# Patient Record
Sex: FEMALE | Race: WHITE | NOT HISPANIC OR LATINO
[De-identification: names, ages, dates, MRNs, and addresses within clinical notes are randomized per-mention and may not be internally consistent; named-entity substitution may affect disease eponyms.]

---

## 2024-03-29 ENCOUNTER — APPOINTMENT (OUTPATIENT)
Dept: BREAST CENTER | Facility: CLINIC | Age: 59
End: 2024-03-29

## 2024-04-25 PROBLEM — Z00.00 ENCOUNTER FOR PREVENTIVE HEALTH EXAMINATION: Status: ACTIVE | Noted: 2024-04-25

## 2024-04-30 ENCOUNTER — APPOINTMENT (OUTPATIENT)
Dept: BREAST CENTER | Facility: CLINIC | Age: 59
End: 2024-04-30
Payer: COMMERCIAL

## 2024-04-30 VITALS
HEART RATE: 89 BPM | HEIGHT: 61 IN | DIASTOLIC BLOOD PRESSURE: 83 MMHG | SYSTOLIC BLOOD PRESSURE: 125 MMHG | OXYGEN SATURATION: 96 % | BODY MASS INDEX: 23.6 KG/M2 | WEIGHT: 125 LBS

## 2024-04-30 DIAGNOSIS — Z90.12 ACQUIRED ABSENCE OF LEFT BREAST AND NIPPLE: ICD-10-CM

## 2024-04-30 DIAGNOSIS — Z87.891 PERSONAL HISTORY OF NICOTINE DEPENDENCE: ICD-10-CM

## 2024-04-30 DIAGNOSIS — Z86.39 PERSONAL HISTORY OF OTHER ENDOCRINE, NUTRITIONAL AND METABOLIC DISEASE: ICD-10-CM

## 2024-04-30 DIAGNOSIS — Z98.890 OTHER SPECIFIED POSTPROCEDURAL STATES: ICD-10-CM

## 2024-04-30 DIAGNOSIS — Z78.9 OTHER SPECIFIED HEALTH STATUS: ICD-10-CM

## 2024-04-30 DIAGNOSIS — Z80.51 FAMILY HISTORY OF MALIGNANT NEOPLASM OF KIDNEY: ICD-10-CM

## 2024-04-30 DIAGNOSIS — R92.0 MAMMOGRAPHIC MICROCALCIFICATION FOUND ON DIAGNOSTIC IMAGING OF BREAST: ICD-10-CM

## 2024-04-30 PROCEDURE — 99205 OFFICE O/P NEW HI 60 MIN: CPT

## 2024-04-30 RX ORDER — ROSUVASTATIN CALCIUM 5 MG/1
TABLET, FILM COATED ORAL
Refills: 0 | Status: ACTIVE | COMMUNITY

## 2024-04-30 RX ORDER — LEVOTHYROXINE SODIUM 137 UG/1
TABLET ORAL
Refills: 0 | Status: ACTIVE | COMMUNITY

## 2024-04-30 NOTE — PHYSICAL EXAM
[Normocephalic] : normocephalic [Atraumatic] : atraumatic [EOMI] : extra ocular movement intact [Supple] : supple [No Supraclavicular Adenopathy] : no supraclavicular adenopathy [No Cervical Adenopathy] : no cervical adenopathy [Examined in the supine and seated position] : examined in the supine and seated position [No dominant masses] : no dominant masses in right breast  [No dominant masses] : no dominant masses left breast [No Nipple Retraction] : no left nipple retraction [No Nipple Discharge] : no left nipple discharge [Breast Mass Right Breast ___cm] : no masses [Breast Mass Left Breast ___cm] : no masses [Breast Nipple Inversion] : nipples not inverted [Breast Nipple Retraction] : nipples not retracted [Breast Nipple Flattening] : nipples not flattened [Breast Nipple Fissures] : nipples not fissured [Breast Abnormal Lactation (Galactorrhea)] : no galactorrhea [Breast Abnormal Secretion Bloody Fluid] : no bloody discharge [Breast Abnormal Secretion Serous Fluid] : no serous discharge [Breast Abnormal Secretion Opalescent Fluid] : no milky discharge [No Axillary Lymphadenopathy] : no left axillary lymphadenopathy [No Edema] : no edema [No Rashes] : no rashes [No Ulceration] : no ulceration [de-identified] : On exam, the patient has small B-cup breasts.  On palpation, she has a healing left breast 6:00 periareolar incision from her recent partial mastectomy with some scarring changes but no suspicious masses.  She has no suspicious masses in the right breast.  She has no axillary, supraclavicular, or cervical adenopathy. [de-identified] : Healing left breast 6:00 periareolar incision with no suspicious findings.

## 2024-04-30 NOTE — HISTORY OF PRESENT ILLNESS
[FreeTextEntry1] : The patient is a 59-year-old G2, P2 postmenopausal white female of [place] and [place] descent.  She underwent menarche at age 11 and had her first child at age 22.  She underwent menopause at age 46 and never took any hormone replacement therapy.  She has no family history of breast or ovarian cancer.  Her sister had renal cell carcinoma and underwent a nephrectomy.  She quit smoking at age 56 and smoked about a half a pack a day starting in her 20s.  Her  is also a smoker.  The patient underwent a screening mammography at [facility] in [place] back on January 2, 2024 showing some increasing calcifications in the left breast 6:00 region.  Diagnostic mammography confirmed these increasing calcifications on January 11, 2024.  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  She underwent a bilateral breast MRI on March 27, 2024 which showed an area of non-mass like enhancement measuring 3.2 x 2.1 x 2.5 cm corresponding to the area of calcifications on mammography.  She was then seen by Dr. Demetrice White at [facility] and underwent a left breast partial mastectomy and sentinel lymph node biopsy through a 6:00 periareolar incision on April 9, 2024.  The final pathology showed 1 negative sentinel lymph node and high-grade DCIS in 10 of 20 slides measuring 1.2 cm in greatest dimension.  She had a positive superior and medial margin and closest inferior and anterior margins less than a millimeter.  The DCIS was ER positive at 90% and ID moderately positive at 60%.  She comes in now as a surgical second opinion/consultation.

## 2024-04-30 NOTE — ASSESSMENT
[FreeTextEntry1] : The patient is a 59-year-old G2, P2 postmenopausal white female of [place] and [place] descent.  She underwent menarche at age 11 and had her first child at age 22.  She underwent menopause at age 46 and never took any hormone replacement therapy.  She has no family history of breast or ovarian cancer.  Her sister had renal cell carcinoma and underwent a nephrectomy.  She quit smoking at age 56 and smoked about a half a pack a day starting in her 20s.  Her  is also a smoker.  The patient underwent a screening mammography at [facility] in [place] back on January 2, 2024 showing some increasing calcifications in the left breast 6:00 region.  Diagnostic mammography confirmed these increasing calcifications on January 11, 2024.  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  She underwent a bilateral breast MRI on March 27, 2024 which showed an area of non-mass like enhancement measuring 3.2 x 2.1 x 2.5 cm corresponding to the area of calcifications on mammography.  She was then seen by Dr. Demetrice White at [facility] and underwent a left breast partial mastectomy and sentinel lymph node biopsy through a 6:00 periareolar incision on April 9, 2024.  The final pathology showed 1 negative sentinel lymph node and high-grade DCIS in 10 of 20 slides measuring 1.2 cm in greatest dimension.  She had a positive superior and medial margin and closest inferior and anterior margins less than a millimeter.  The DCIS was ER positive at 90% and SC moderately positive at 60%.  I reviewed her imaging from [facility] and indeed she had a fairly extensive area of calcifications in the 6:00 region of the left breast extending posteriorly.  I did review the pathology from her partial mastectomy as well on April 9, 2024 which showed this extensive DCIS with a positive superior and medial margin and close inferior and anterior margin.  She did have bracketed wire localizations.  I did speak to her with her  and sister present during the discussion regarding her surgical options.  I would suggest a mastectomy given the extent the calcifications I am seeing on mammography and her breast size.  She could reattempt a partial mastectomy but she would then risk the possible need for further excision or a possible high risk of local recurrence given the extent of this DCIS.  I think she would be a candidate for nipple sparing technique and I did review her images and it looks like the cancer was far away from the nipple but I will have our radiologist review the films.  I did tell her that I do retroareolar biopsies at the time of nipple sparing mastectomies and if these did show cancer, I would remove the nipples.  She would also need a slide review if I did perform surgery.  She was offered immediate reconstruction if she does go through with the mastectomy and I gave her Dr. oRdgers and Dr. Lerman's card to make an appointment.  She would not require any further virgie evaluation.  The patient is leaning towards implant reconstruction.  She was asking about prophylactic contralateral mastectomy but I am dissuading her from this given the lack of any survival benefit.  I did also speak to her about genetic testing and she would like to think about this and will get back to us if she wants to proceed.  Risk benefits of surgery including infection, hematoma, and skin flap necrosis or nipple loss were described to the patient and she has a full understanding.  All their questions were answered and she will make appointments with her plastic surgeons and then we can go from there for possible scheduling.

## 2024-04-30 NOTE — REASON FOR VISIT
[Initial Evaluation] : an initial evaluation [FreeTextEntry1] : The patient is a postmenopausal white female of [place] and [place]ian descent with no family history of breast or ovarian cancer.  She was found to have some increasing calcifications in her left breast 6:00 region on screening mammography in January 2024 and underwent a stereotactic core biopsy at [facility] in February 2024 showing high nuclear grade DCIS which was ER/AL positive.  MRI showed localized but significant non-mass like enhancement around the calcifications.  She was seen by Dr. Demetrice White at [facility] and underwent a left breast partial mastectomy and sentinel lymph node biopsy on April 9, 2024 and had 1 negative sentinel lymph node and the partial mastectomy showed fairly extensive DCIS and 10 out of 20 slides with a positive superior and medial margin and close inferior and anterior margins less than 1 mm.  She comes in now for a surgical second opinion/discussion

## 2024-04-30 NOTE — PAST MEDICAL HISTORY
[Postmenopausal] : The patient is postmenopausal [Menarche Age ____] : age at menarche was [unfilled] [Menopause Age____] : age at menopause was [unfilled] [unknown] : the patient is unsure of the date of her LMP [Total Preg ___] : G[unfilled] [Live Births ___] : P[unfilled]  [Age At Live Birth ___] : Age at live birth: [unfilled] [History of Hormone Replacement Treatment] : has no history of hormone replacement treatment

## 2024-04-30 NOTE — ADDENDUM
[FreeTextEntry1] : I spent greater than 75% of consultation in face-to-face counseling and coordination of care in this patient with newly diagnosed fairly extensive left breast DCIS status post a prior partial mastectomy with positive margins.

## 2024-05-01 ENCOUNTER — TRANSCRIPTION ENCOUNTER (OUTPATIENT)
Age: 59
End: 2024-05-01

## 2024-05-02 ENCOUNTER — NON-APPOINTMENT (OUTPATIENT)
Age: 59
End: 2024-05-02

## 2024-05-14 ENCOUNTER — NON-APPOINTMENT (OUTPATIENT)
Age: 59
End: 2024-05-14

## 2024-05-14 ENCOUNTER — APPOINTMENT (OUTPATIENT)
Dept: PLASTIC SURGERY | Facility: CLINIC | Age: 59
End: 2024-05-14
Payer: COMMERCIAL

## 2024-05-14 VITALS
HEART RATE: 78 BPM | BODY MASS INDEX: 23.62 KG/M2 | RESPIRATION RATE: 16 BRPM | OXYGEN SATURATION: 98 % | WEIGHT: 125 LBS

## 2024-05-14 PROCEDURE — 99204 OFFICE O/P NEW MOD 45 MIN: CPT

## 2024-05-14 NOTE — HISTORY OF PRESENT ILLNESS
[FreeTextEntry1] : 58y/o female presents for initial consultation referred by Dr. Jarrett for b/l breast reconstruction after B/L mastectomy. Mammogram was done in January 2024 which showed increasing calcifications in her left breast. A stereotactic core biopsy was done in February 2024 showing high nuclear grade DCIS which was ER/UT positive. No family hx of breast or ovarian cancer. No hx of bleeding/clotting disorders. Has never had radiation treatments. Has never had abdominal surgery.   B/L breasts soft, L breast healed periareolar incision with scarring BD B/L 11-12 cm  Plan: Today we discussed all options for breast reconstruction including no reconstruction and reconstruction using tissue expanders and implants, as well as autologous reconstruction. The nature of each surgery, its risks, benefits, alternatives, expected postoperative course, recovery and long-term results were discussed in detail. All questions were answered. FORTINO is motivated to proceed with implant reconstruction. Will coordinate care with Dr. Jarrett.

## 2024-05-15 ENCOUNTER — NON-APPOINTMENT (OUTPATIENT)
Age: 59
End: 2024-05-15

## 2024-05-22 ENCOUNTER — NON-APPOINTMENT (OUTPATIENT)
Age: 59
End: 2024-05-22

## 2024-06-03 ENCOUNTER — NON-APPOINTMENT (OUTPATIENT)
Age: 59
End: 2024-06-03

## 2024-06-03 ENCOUNTER — RESULT REVIEW (OUTPATIENT)
Age: 59
End: 2024-06-03

## 2024-06-07 ENCOUNTER — APPOINTMENT (OUTPATIENT)
Dept: PLASTIC SURGERY | Facility: CLINIC | Age: 59
End: 2024-06-07

## 2024-06-07 RX ORDER — OXYCODONE AND ACETAMINOPHEN 5; 325 MG/1; MG/1
5-325 TABLET ORAL EVERY 6 HOURS
Qty: 20 | Refills: 0 | Status: ACTIVE | COMMUNITY
Start: 2024-06-07 | End: 1900-01-01

## 2024-06-12 ENCOUNTER — TRANSCRIPTION ENCOUNTER (OUTPATIENT)
Age: 59
End: 2024-06-12

## 2024-06-12 ENCOUNTER — APPOINTMENT (OUTPATIENT)
Dept: BREAST CENTER | Facility: HOSPITAL | Age: 59
End: 2024-06-12

## 2024-06-12 ENCOUNTER — RESULT REVIEW (OUTPATIENT)
Age: 59
End: 2024-06-12

## 2024-06-13 ENCOUNTER — TRANSCRIPTION ENCOUNTER (OUTPATIENT)
Age: 59
End: 2024-06-13

## 2024-06-17 NOTE — REASON FOR VISIT
[Post Op: _________] : a [unfilled] post op visit [FreeTextEntry1] : The patient is a postmenopausal white female of [place] and [place]og[place] descent with no family history of breast or ovarian cancer.  She was found to have some increasing calcifications in her left breast 6:00 region on screening mammography in January 2024 and underwent a stereotactic core biopsy at [facility] in February 2024 showing high nuclear grade DCIS which was ER/NV positive.  MRI showed localized but significant non-mass like enhancement around the calcifications.  She was seen by Dr. Demetrice White at [facility] and underwent a left breast partial mastectomy and sentinel lymph node biopsy on April 9, 2024 and had 1 negative sentinel lymph node and the partial mastectomy showed fairly extensive DCIS in 10 out of 20 slides with a positive superior and medial margin and close inferior and anterior margins less than 1 mm.  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  Images were reviewed and it was felt that mastectomy was indicated.  The patient chose to undergo bilateral mastectomies and this was performed with a nipple sparing technique with prepectoral expander reconstructions by Dr. Rodgers on June 12, 2024.  She comes in now for postop follow-up.

## 2024-06-17 NOTE — HISTORY OF PRESENT ILLNESS
[FreeTextEntry1] : The patient is a 59-year-old G2, P2 postmenopausal white female of Sandi and Persian descent.  She underwent menarche at age 11 and had her first child at age 22.  She underwent menopause at age 46 and never took any hormone replacement therapy.  She has no family history of breast or ovarian cancer.  Her sister had renal cell carcinoma and underwent a nephrectomy.  She quit smoking at age 56 and smoked about a half a pack a day starting in her 20s.  Her  is also a smoker.  The patient underwent a screening mammography at [facility] in [place] back on January 2, 2024 showing some increasing calcifications in the left breast 6:00 region.  Diagnostic mammography confirmed these increasing calcifications on January 11, 2024.  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  She underwent a bilateral breast MRI on March 27, 2024 which showed an area of non-mass like enhancement measuring 3.2 x 2.1 x 2.5 cm corresponding to the area of calcifications on mammography.  She was then seen by Dr. Demetrice White at [facility] and underwent a left breast partial mastectomy and sentinel lymph node biopsy through a 6:00 periareolar incision on April 9, 2024.  The final pathology showed 1 negative sentinel lymph node and high-grade DCIS in 10 of 20 slides measuring 1.2 cm in greatest dimension.  She had a positive superior and medial margin and closest inferior and anterior margins less than a millimeter.  The DCIS was ER positive at 90% and DC moderately positive at 60%.  She was seen as a second opinion on April 30, 2024 and it was felt that a left mastectomy was indicated.  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  Her images were reviewed by my radiologist and it was felt that the cancer was far in enough away from the nipple to offer her nipple sparing technique.  The patient chose to have a contralateral prophylactic mastectomy.  She underwent the bilateral nipple sparing mastectomies through an inframammary technique with prepectoral expander reconstructions by Dr. Rodgers on June 12, 2024.  The final pathology showed ???????. The patient comes in now for postop follow-up.

## 2024-06-17 NOTE — PHYSICAL EXAM
[Normocephalic] : normocephalic [Atraumatic] : atraumatic [EOMI] : extra ocular movement intact [Supple] : supple [No Supraclavicular Adenopathy] : no supraclavicular adenopathy [No Cervical Adenopathy] : no cervical adenopathy [Examined in the supine and seated position] : examined in the supine and seated position [No dominant masses] : no dominant masses in right breast  [No dominant masses] : no dominant masses left breast [No Nipple Retraction] : no left nipple retraction [No Nipple Discharge] : no left nipple discharge [Breast Mass Right Breast ___cm] : no masses [Breast Mass Left Breast ___cm] : no masses [No Axillary Lymphadenopathy] : no left axillary lymphadenopathy [No Edema] : no edema [No Rashes] : no rashes [No Ulceration] : no ulceration [Breast Nipple Inversion] : nipples not inverted [Breast Nipple Retraction] : nipples not retracted [Breast Nipple Flattening] : nipples not flattened [Breast Nipple Fissures] : nipples not fissured [Breast Abnormal Lactation (Galactorrhea)] : no galactorrhea [Breast Abnormal Secretion Bloody Fluid] : no bloody discharge [Breast Abnormal Secretion Serous Fluid] : no serous discharge [Breast Abnormal Secretion Opalescent Fluid] : no milky discharge [de-identified] : On exam, the patient is doing well after her bilateral nipple sparing mastectomies with prepectoral expander reconstructions.  Her wounds are healing well.  Her drains are ??????? [de-identified] : Status post prophylactic contralateral nipple sparing mastectomy through an inframammary approach with prepectoral expander reconstruction.  Wounds are healing well. Her drain is ?????? [de-identified] : Status post completion nipple sparing mastectomy through an inframammary approach with prepectoral expander reconstruction.  Wounds are healing well.  Her drain is ???????

## 2024-06-17 NOTE — ASSESSMENT
[FreeTextEntry1] : The patient is a 59-year-old G2, P2 postmenopausal white female of Sandi and [place] descent.  She underwent menarche at age 11 and had her first child at age 22.  She underwent menopause at age 46 and never took any hormone replacement therapy.  She has no family history of breast or ovarian cancer.  Her sister had renal cell carcinoma and underwent a nephrectomy.  She quit smoking at age 56 and smoked about a half a pack a day starting in her 20s.  Her  is also a smoker.  The patient underwent a screening mammography at [facility] in [place] back on January 2, 2024 showing some increasing calcifications in the left breast 6:00 region.  Diagnostic mammography confirmed these increasing calcifications on January 11, 2024.  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  She underwent a bilateral breast MRI on March 27, 2024 which showed an area of non-mass like enhancement measuring 3.2 x 2.1 x 2.5 cm corresponding to the area of calcifications on mammography.  She was then seen by Dr. Demetrice White at [facility] and underwent a left breast partial mastectomy and sentinel lymph node biopsy through a 6:00 periareolar incision on April 9, 2024.  The final pathology showed 1 negative sentinel lymph node and high-grade DCIS in 10 of 20 slides measuring 1.2 cm in greatest dimension.  She had a positive superior and medial margin and closest inferior and anterior margins less than a millimeter.  The DCIS was ER positive at 90% and TN moderately positive at 60%. She was seen by me as a second opinion on April 30, 2024 and after I reviewed all her images, I felt that a left mastectomy was indicated.  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  Her images were reviewed by my radiologist and it was felt that the cancer was far in enough away from the nipple to offer her nipple sparing technique.  The patient chose to have a contralateral prophylactic mastectomy.  She underwent the bilateral nipple sparing mastectomies through an inframammary technique with prepectoral expander reconstructions by Dr. Rodgers on June 12, 2024.  The final pathology showed ???????. On exam today, ....... I reviewed the pathology with the patient and gave her a copy of the report for her records.  She understands that since this was all DCIS that there has not been shown any benefit for endocrine therapy.  She will not require any radiation oncology evaluation.  She should continue 6-month follow-up and I would like to get an MRI 1 year postop.  The patient should continue to follow-up with Dr. Rodgers from a cosmetic standpoint.

## 2024-06-18 ENCOUNTER — APPOINTMENT (OUTPATIENT)
Dept: PLASTIC SURGERY | Facility: CLINIC | Age: 59
End: 2024-06-18
Payer: COMMERCIAL

## 2024-06-18 DIAGNOSIS — Z98.890 OTHER SPECIFIED POSTPROCEDURAL STATES: ICD-10-CM

## 2024-06-18 PROCEDURE — 99024 POSTOP FOLLOW-UP VISIT: CPT

## 2024-06-18 NOTE — HISTORY OF PRESENT ILLNESS
[FreeTextEntry1] : 60y/o female presents POD# 6 s/p B/L breast recon on 6/12/24. Patient is here today for a routine post-op visit she denies any f/c/n/v. She reports taking Tylenol for pain as needed and has 2 FAUSTO drains in-place.  Dressings taken down, B/L breasts soft, incisions intact, minimal bruising to right breast, implants in good position  No collections or infections  B/L breast drains with moderate serosanguineous drainage, not ready for removal yet   Plan: -continue to maintain B/L breast drains  -f/u in 1 week -activity restrictions reviewed  -sports bra  -case discussed with Dr. Rodgers

## 2024-06-25 ENCOUNTER — APPOINTMENT (OUTPATIENT)
Dept: PLASTIC SURGERY | Facility: CLINIC | Age: 59
End: 2024-06-25
Payer: COMMERCIAL

## 2024-06-25 PROCEDURE — 99024 POSTOP FOLLOW-UP VISIT: CPT

## 2024-06-27 ENCOUNTER — APPOINTMENT (OUTPATIENT)
Dept: BREAST CENTER | Facility: CLINIC | Age: 59
End: 2024-06-27
Payer: COMMERCIAL

## 2024-06-27 VITALS
OXYGEN SATURATION: 96 % | SYSTOLIC BLOOD PRESSURE: 124 MMHG | HEIGHT: 61 IN | DIASTOLIC BLOOD PRESSURE: 79 MMHG | BODY MASS INDEX: 23.41 KG/M2 | WEIGHT: 124 LBS | HEART RATE: 69 BPM

## 2024-06-27 DIAGNOSIS — D05.12 INTRADUCTAL CARCINOMA IN SITU OF LEFT BREAST: ICD-10-CM

## 2024-06-27 DIAGNOSIS — Z90.13 ACQUIRED ABSENCE OF BILATERAL BREASTS AND NIPPLES: ICD-10-CM

## 2024-06-27 DIAGNOSIS — Z85.3 PERSONAL HISTORY OF MALIGNANT NEOPLASM OF BREAST: ICD-10-CM

## 2024-06-27 PROCEDURE — 99024 POSTOP FOLLOW-UP VISIT: CPT

## 2024-06-27 NOTE — PHYSICAL EXAM
[Normocephalic] : normocephalic [Atraumatic] : atraumatic [EOMI] : extra ocular movement intact [Supple] : supple [No Supraclavicular Adenopathy] : no supraclavicular adenopathy [No Cervical Adenopathy] : no cervical adenopathy [Examined in the supine and seated position] : examined in the supine and seated position [No dominant masses] : no dominant masses in right breast  [No dominant masses] : no dominant masses left breast [No Nipple Retraction] : no left nipple retraction [No Nipple Discharge] : no left nipple discharge [Breast Mass Right Breast ___cm] : no masses [Breast Mass Left Breast ___cm] : no masses [No Axillary Lymphadenopathy] : no left axillary lymphadenopathy [No Edema] : no edema [No Rashes] : no rashes [No Ulceration] : no ulceration [Breast Nipple Inversion] : nipples not inverted [Breast Nipple Retraction] : nipples not retracted [Breast Nipple Flattening] : nipples not flattened [Breast Nipple Fissures] : nipples not fissured [Breast Abnormal Lactation (Galactorrhea)] : no galactorrhea [Breast Abnormal Secretion Bloody Fluid] : no bloody discharge [Breast Abnormal Secretion Serous Fluid] : no serous discharge [Breast Abnormal Secretion Opalescent Fluid] : no milky discharge [de-identified] : On exam, the patient is doing well after her bilateral nipple sparing mastectomies with prepectoral direct to implant reconstructions.  Her wounds are healing well.  Her drains have been removed by plastic surgery.  Her skin flaps are viable though she does have some mild epidermolysis of the right nipple areolar complex which should self resolve. [de-identified] : Status post prophylactic contralateral nipple sparing mastectomy through an inframammary approach with prepectoral direct to implant reconstruction.  Wounds are healing well. Her drain has been removed.  She does have some mild epidermolysis of the right nipple areolar complex which should self resolve. [de-identified] : Status post completion nipple sparing mastectomy through an inframammary approach with prepectoral direct to implant reconstruction.  Wounds are healing well.  Her drain has been removed and her skin flap is warm and viable.

## 2024-06-27 NOTE — REASON FOR VISIT
[Post Op: _________] : a [unfilled] post op visit [FreeTextEntry1] : The patient is a postmenopausal white female of [place] and [place] of [place] descent with no family history of breast or ovarian cancer.  She was found to have some increasing calcifications in her left breast 6:00 region on screening mammography in January 2024 and underwent a stereotactic core biopsy at [facility] in February 2024 showing high nuclear grade DCIS which was ER/NC positive.  MRI showed localized but significant non-mass like enhancement around the calcifications.  She was seen by Dr. Demetrice White at [facility] and underwent a left breast partial mastectomy and sentinel lymph node biopsy on April 9, 2024 and had 1 negative sentinel lymph node and the partial mastectomy showed fairly extensive DCIS in 10 out of 20 slides with a positive superior and medial margin and close inferior and anterior margins less than 1 mm.  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  Images were reviewed and it was felt that mastectomy was indicated.  The patient chose to undergo bilateral mastectomies and this was performed with a nipple sparing technique with prepectoral direct to implant reconstructions by Dr. Rodgers on June 12, 2024.  The final pathology just showed residual high-grade DCIS measuring up to 1.5 cm with completely clear margins and the retroareolar biopsy was negative.  The prophylactic right breast mastectomy was benign as well as the retroareolar biopsy.  She comes in now for postop follow-up.

## 2024-06-27 NOTE — ASSESSMENT
[FreeTextEntry1] : The patient is a 59-year-old G2, P2 postmenopausal white female of Sandi and Armenian descent.  She underwent menarche at age 11 and had her first child at age 22.  She underwent menopause at age 46 and never took any hormone replacement therapy.  She has no family history of breast or ovarian cancer.  Her sister had renal cell carcinoma and underwent a nephrectomy.  She quit smoking at age 56 and smoked about a half a pack a day starting in her 20s.  Her  is also a smoker.  The patient underwent a screening mammography at [facility] in [place], [place] back on January 2, 2024 showing some increasing calcifications in the left breast 6:00 region.  Diagnostic mammography confirmed these increasing calcifications on January 11, 2024.  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  She underwent a bilateral breast MRI on March 27, 2024 which showed an area of non-mass like enhancement measuring 3.2 x 2.1 x 2.5 cm corresponding to the area of calcifications on mammography.  She was then seen by Dr. Demetrice White at [facility] and underwent a left breast partial mastectomy and sentinel lymph node biopsy through a 6:00 periareolar incision on April 9, 2024.  The final pathology showed 1 negative sentinel lymph node and high-grade DCIS in 10 of 20 slides measuring 1.2 cm in greatest dimension.  She had a positive superior and medial margin and closest inferior and anterior margins less than a millimeter.  The DCIS was ER positive at 90% and WV moderately positive at 60%. She was seen by me as a second opinion on April 30, 2024 and after I reviewed all her images, I felt that a left mastectomy was indicated.  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  Her images were reviewed by my radiologist and it was felt that the cancer was far in enough away from the nipple to offer her nipple sparing technique.  The patient chose to have a contralateral prophylactic mastectomy.  She underwent the bilateral nipple sparing mastectomies through an inframammary technique with prepectoral direct to implant reconstructions by Dr. Rodgers on June 12, 2024. The final pathology showed some residual high-grade DCIS measuring up to 1.5 cm but margins were all clear as well as the left breast retroareolar biopsy.  The prophylactic right breast mastectomy just showed benign fibrocystic change in the right retroareolar biopsy was benign.  This was a left breast stage 0 breast cancer.  On exam today, she has a good cosmetic result and her wounds are healing well and her nipples are viable though she does have some mild epidermolysis of the right nipple areolar complex which should self resolve.  I reviewed the pathology with the patient and gave her a copy of the report for her records.  She understands that since this was all DCIS that there has not been shown any benefit for endocrine therapy.  She will not require any radiation oncology evaluation.  I would like her to see medical oncology postoperatively however and she was given Dr. Snow's card to make an appointment.  She should follow-up again in 3 months and I would like her to get an MRI 1 year postop.  The patient should continue to follow-up with Dr. Rodgers from a cosmetic standpoint.

## 2024-06-27 NOTE — HISTORY OF PRESENT ILLNESS
[FreeTextEntry1] : The patient is a 59-year-old G2, P2 postmenopausal white female of Sand[place] and [place] descent.  She underwent menarche at age 11 and had her first child at age 22.  She underwent menopause at age 46 and never took any hormone replacement therapy.  She has no family history of breast or ovarian cancer.  Her sister had renal cell carcinoma and underwent a nephrectomy.  She quit smoking at age 56 and smoked about a half a pack a day starting in her 20s.  Her  is also a smoker.  The patient underwent a screening mammography at [facility] in [place] back on January 2, 2024 showing some increasing calcifications in the left breast 6:00 region.  Diagnostic mammography confirmed these increasing calcifications on January 11, 2024.  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  She underwent a bilateral breast MRI on March 27, 2024 which showed an area of non-mass like enhancement measuring 3.2 x 2.1 x 2.5 cm corresponding to the area of calcifications on mammography.  She was then seen by Dr. Demetrice White at [facility] and underwent a left breast partial mastectomy and sentinel lymph node biopsy through a 6:00 periareolar incision on April 9, 2024.  The final pathology showed 1 negative sentinel lymph node and high-grade DCIS in 10 of 20 slides measuring 1.2 cm in greatest dimension.  She had a positive superior and medial margin and closest inferior and anterior margins less than a millimeter.  The DCIS was ER positive at 90% and WV moderately positive at 60%.  She was seen as a second opinion on April 30, 2024 and it was felt that a left mastectomy was indicated.  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  Her images were reviewed by my radiologist and it was felt that the cancer was far in enough away from the nipple to offer her nipple sparing technique.  The patient chose to have a contralateral prophylactic mastectomy.  She underwent the bilateral nipple sparing mastectomies through an inframammary technique with prepectoral direct to implant reconstructions by Dr. Rodgers on June 12, 2024.  The final pathology showed some residual high-grade DCIS measuring up to 1.5 cm but margins were all clear as well as the left breast retroareolar biopsy.  The prophylactic right breast mastectomy just showed benign fibrocystic change in the right retroareolar biopsy was benign.  This was a left breast stage 0 breast cancer. The patient comes in now for postop follow-up.

## 2024-07-02 NOTE — HISTORY OF PRESENT ILLNESS
[FreeTextEntry1] : Routine follow-up visit, s/p B/L breast direct to implant recon on 6/12/24. Patient denies any f/c/n/v. She reports taking Tylenol for pain as needed and has 2 FAUSTO drains in-place.  B/L breasts soft, implants in good position, incisions healing well, no collections or infections  B/L drains with minimal serous drainage, both removed   Plan: -activity restrictions reviewed  -wound care reviewed  -f/u in 6 weeks

## 2024-08-13 ENCOUNTER — APPOINTMENT (OUTPATIENT)
Dept: PLASTIC SURGERY | Facility: CLINIC | Age: 59
End: 2024-08-13
Payer: COMMERCIAL

## 2024-08-13 PROCEDURE — 99213 OFFICE O/P EST LOW 20 MIN: CPT | Mod: 24

## 2024-08-13 NOTE — HISTORY OF PRESENT ILLNESS
[FreeTextEntry1] :  s/p B/L breast reconstruction with direct to implant placement on 6/12/24.  B/L breasts soft, implants in good position, incisions healing well, no collections or infections   Reviewed options for fat grafting in future to smooth contour deformites. Follow up in 4 months to reassess healing.

## 2024-08-26 ENCOUNTER — APPOINTMENT (OUTPATIENT)
Dept: HEMATOLOGY ONCOLOGY | Facility: CLINIC | Age: 59
End: 2024-08-26
Payer: COMMERCIAL

## 2024-08-26 ENCOUNTER — RESULT REVIEW (OUTPATIENT)
Age: 59
End: 2024-08-26

## 2024-08-26 VITALS
TEMPERATURE: 98.3 F | HEART RATE: 69 BPM | SYSTOLIC BLOOD PRESSURE: 125 MMHG | DIASTOLIC BLOOD PRESSURE: 72 MMHG | BODY MASS INDEX: 24.39 KG/M2 | OXYGEN SATURATION: 96 % | HEIGHT: 61 IN | RESPIRATION RATE: 16 BRPM | WEIGHT: 129.19 LBS

## 2024-08-26 DIAGNOSIS — D05.12 INTRADUCTAL CARCINOMA IN SITU OF LEFT BREAST: ICD-10-CM

## 2024-08-26 DIAGNOSIS — Z90.13 ACQUIRED ABSENCE OF BILATERAL BREASTS AND NIPPLES: ICD-10-CM

## 2024-08-26 DIAGNOSIS — Z98.890 OTHER SPECIFIED POSTPROCEDURAL STATES: ICD-10-CM

## 2024-08-26 DIAGNOSIS — Z87.891 PERSONAL HISTORY OF NICOTINE DEPENDENCE: ICD-10-CM

## 2024-08-26 PROCEDURE — 99205 OFFICE O/P NEW HI 60 MIN: CPT

## 2024-08-26 NOTE — CONSULT LETTER
[Dear  ___] : Dear  [unfilled], [Consult Letter:] : I had the pleasure of evaluating your patient, [unfilled]. [Please see my note below.] : Please see my note below. [Consult Closing:] : Thank you very much for allowing me to participate in the care of this patient.  If you have any questions, please do not hesitate to contact me. [Sincerely,] : Sincerely, [FreeTextEntry3] : Maddy Snow DO, FACTAQUERIA, FACP Medical Oncology and Hematology [facility]

## 2024-08-26 NOTE — HISTORY OF PRESENT ILLNESS
[de-identified] : Ms. Dee is a 59-year-old G2, P2 postmenopausal female that presents for initial consultation referred by breast surgery Dr. Jarrett for newly diagnosed DCIS.   Oncological History:   24 s/p screening mammography at [facility] showing some increasing calcifications in the left breast 6:00 region.   24 s/p diagnostic mammography confirmed these increasing calcifications  24 s/p stereotactic core biopsy showing high-grade DCIS in his left breast 6:00 region 4 cm from the nipple.   3/27/24 s/p bilateral breast MRI which showed an area of non-mass like enhancement measuring 3.2 x 2.1 x 2.5 cm corresponding to the area of calcfications on mammography.   24 she was then seen by Dr. Demetrice White at [facility] and underwent a left breast partial mastectomy and sentinel lymph node biopsy through a 6:00 periareolar incision. The final pathology showed 1 negative sentinel lymph node and high-grade DCIS in 10 of 20 slides measuring 1.2 cm in greatest dimension. She had a positive superior and medial margin and closest inferior and anterior margins less than a millimeter. The DCIS was ER positive at 90% and GA moderately positive at 60%.   24 she was seen as a second opinion and it was felt that a left mastectomy was indicated.   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   Her images were reviewed by radiologist and it was felt that the cancer was far in enough away from the nipple to offer her nipple sparing technique.   The patient chose to have a contralateral prophylactic mastectomy.   24 s/p bilateral nipple sparing mastectomies through an inframammary technique with prepectoral direct to implant reconstructions. The final pathology showed some residual high-grade DCIS measuring up to 1.5 cm but margins were all clear as well as the left breast retroareolar biopsy. The prophylactic right breast mastectomy just showed benign fibrocystic change in the right retroareolar biopsy was benign.   Breast Cancer Risk Factors: Menarche: 11 Date of LMP: 46 Menopause: yes   Age at first live birth: 22 Nursed: N Hysterectomy: N Oophorectomy: N OCP: 5-6 years HRT: never took any hormone replacement therapy.  Last pap/pelvic exam:8/ 2023 Related family history She has no family history of breast or ovarian cancer. Her sister had renal cell carcinoma and underwent a nephrectomy.  Ashkenazi: N BRCA testing: N She quit smoking at age 56 and smoked about a half a pack a day starting in her 20s. Her  is also a smoker.

## 2024-08-26 NOTE — HISTORY OF PRESENT ILLNESS
[de-identified] : Ms. Dee is a 59-year-old G2, P2 postmenopausal female that presents for initial consultation referred by breast surgery Dr. Jarrett for newly diagnosed DCIS.   Oncological History:   24 s/p screening mammography at [facility] showing some increasing calcifications in the left breast 6:00 region.   24 s/p diagnostic mammography confirmed these increasing calcifications  24 s/p stereotactic core biopsy showing high-grade DCIS in his left breast 6:00 region 4 cm from the nipple.   3/27/24 s/p bilateral breast MRI which showed an area of non-mass like enhancement measuring 3.2 x 2.1 x 2.5 cm corresponding to the area of calcfications on mammography.   24 she was then seen by Dr. Demetrice White at [facility] and underwent a left breast partial mastectomy and sentinel lymph node biopsy through a 6:00 periareolar incision. The final pathology showed 1 negative sentinel lymph node and high-grade DCIS in 10 of 20 slides measuring 1.2 cm in greatest dimension. She had a positive superior and medial margin and closest inferior and anterior margins less than a millimeter. The DCIS was ER positive at 90% and WA moderately positive at 60%.   24 she was seen as a second opinion and it was felt that a left mastectomy was indicated.   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   Her images were reviewed by radiologist and it was felt that the cancer was far in enough away from the nipple to offer her nipple sparing technique.   The patient chose to have a contralateral prophylactic mastectomy.   24 s/p bilateral nipple sparing mastectomies through an inframammary technique with prepectoral direct to implant reconstructions. The final pathology showed some residual high-grade DCIS measuring up to 1.5 cm but margins were all clear as well as the left breast retroareolar biopsy. The prophylactic right breast mastectomy just showed benign fibrocystic change in the right retroareolar biopsy was benign.   Breast Cancer Risk Factors: Menarche: 11 Date of LMP: 46 Menopause: yes   Age at first live birth: 22 Nursed: N Hysterectomy: N Oophorectomy: N OCP: 5-6 years HRT: never took any hormone replacement therapy.  Last pap/pelvic exam:8/ 2023 Related family history She has no family history of breast or ovarian cancer. Her sister had renal cell carcinoma and underwent a nephrectomy.  Ashkenazi: N BRCA testing: N She quit smoking at age 56 and smoked about a half a pack a day starting in her 20s. Her  is also a smoker.

## 2024-08-26 NOTE — ASSESSMENT
[FreeTextEntry1] : # DCIS, ER/OK+ sp bilateral mastectomy We have reviewed the diagnosis, prognosis and natural history of DCIS. We have reviewed the imaging and pathology reports personally and discussed the findings with the patient. We have discussed that she has had bilateral mastectomies and although there may be minimal residual tissue due to nipple sparing I believe the risk of side effects from endocrine therapy outweighs the benefit. Kolby Elliott is in agreement.  she will continue to follow with Dr. Jarrett for clinical exams  # smoking h/o quit 3 years. ago >20 pack year The USPSTF recommends annual screening for lung cancer with low-dose computed tomography (LDCT) in adults aged 50 to 80 years who have a 20 pack-year smoking history and currently smoke or have quit within the past 15 years. recommend LDCT chest  #Health maintenance GYN 8/23 - last pap [facility] within last 10 years. DERM - sees yearly. LDCT chest - given smoking history

## 2024-08-26 NOTE — ASSESSMENT
[FreeTextEntry1] : # DCIS, ER/AZ+ sp bilateral mastectomy We have reviewed the diagnosis, prognosis and natural history of DCIS. We have reviewed the imaging and pathology reports personally and discussed the findings with the patient. We have discussed that she has had bilateral mastectomies and although there may be minimal residual tissue due to nipple sparing I believe the risk of side effects from endocrine therapy outweighs the benefit. Kolby Elliott is in agreement.  she will continue to follow with Dr. Jarrett for clinical exams  # smoking h/o quit 3 years. ago >20 pack year The USPSTF recommends annual screening for lung cancer with low-dose computed tomography (LDCT) in adults aged 50 to 80 years who have a 20 pack-year smoking history and currently smoke or have quit within the past 15 years. recommend LDCT chest  #Health maintenance GYN 8/23 - last pap CNY within last 10 years. DERM - sees yearly. LDCT chest - given smoking history

## 2024-08-26 NOTE — CONSULT LETTER
[Dear  ___] : Dear  [unfilled], [Consult Letter:] : I had the pleasure of evaluating your patient, [unfilled]. [Please see my note below.] : Please see my note below. [Consult Closing:] : Thank you very much for allowing me to participate in the care of this patient.  If you have any questions, please do not hesitate to contact me. [Sincerely,] : Sincerely, [FreeTextEntry3] : Maddy Snow DO, FACTAQUERIA, FACP Medical Oncology and Hematology [facility] This 40.3 weeks GA term female infant delivered by  to 31yrs old  mother for Post dates.  Maternal PNL nl - GBS- neg, O neg received Rhogam @ 28weeks   Apgar 9/9\Mom jasmeet Pos  O-ve/B+ve- bili @ 10PM/ 3AM & 5 PM is 6.3-7.8-8.9 infant was started on Doublr  changed to Tripple and transferred to [facility] for further care.    Assessment: 1. 40.3 weeks Term female infant 2. ABO/RH incompatibility ( Oneg/ B+ve/ Mom received Rhogam @28weeks)                     3. Hyperbilirubinemia ( hemolytic)  Wt: 3467 gms L 52 cm    Resp: onfant stable on RA  FEN: infant feeding well - ta20-25 ml Sim 360, started to stool Void not documented  COR: S1-S2 nl, no heart murmur  Heme/Bili: CBc In WBC 34.4 16.0/48.8 retic 11%   Hyperbili: O neg/ B+ve  Coomb's pos, Received Rhogam @ 28weeks                 Bili @ birth 6.3- 7.8-8.9  @ 7hr its 8.9                Infant was started on Double Photo. Because of continue rise to 8.9@ 7hrs transferred to [facility] for further care.  Neuro: Good tone and activity  Social: Infant's condition discussed with both parents.   Plan:   Admit SCN  CBc/ Bili/ BMP/ LFTs  IVIG 500mg/kg   This 40.3 weeks GA term female infant delivered by  to 31yrs old  mother for Post dates.  Maternal PNL nl - GBS- neg, O neg received Rhogam @ 28weeks   Apgar 9/9\Mom jasmeet Pos  O-ve/B+ve- bili @ 10PM/ 3AM & 5 PM is 6.3-7.8-8.9 infant was started on Doublr  changed to Tripple and transferred to [facility] for further care.    Assessment: 1. 40.3 weeks Term female infant 2. ABO/RH incompatibility ( Oneg/ B+ve/ Mom received Rhogam @28weeks)                     3. Hyperbilirubinemia ( hemolytic)  Wt: 3467 gms L 52 cm    Resp: onfant stable on RA  FEN: infant feeding well - ta20-25 ml Sim 360, started to stool Void not documented  COR: S1-S2 nl, no heart murmur  Heme/Bili: CBc In WBC 34.4 16.0/48.8 retic 11%   Hyperbili: O neg/ B+ve  Coomb's pos, Received Rhogam @ 28weeks                 Bili @ birth 6.3- 7.8-8.9  @ 7hr its 8.9                Infant was started on Double Photo. Because of continue rise to 8.9@ 7hrs transferred to [facility] for further care.  Neuro: Good tone and activity  Social: Infant's condition discussed with both parents. They have been explained about IVIG and possibility of Transfer if it continues to rise.  Plan:   Admit SCN  CBc/ Bili/ BMP/ LFTs  IVIG 500mg/kg

## 2024-08-28 ENCOUNTER — NON-APPOINTMENT (OUTPATIENT)
Age: 59
End: 2024-08-28

## 2024-09-04 ENCOUNTER — APPOINTMENT (OUTPATIENT)
Dept: THORACIC SURGERY | Facility: CLINIC | Age: 59
End: 2024-09-04
Payer: COMMERCIAL

## 2024-09-04 VITALS — BODY MASS INDEX: 23.6 KG/M2 | WEIGHT: 125 LBS | HEIGHT: 61 IN

## 2024-09-04 DIAGNOSIS — Z87.891 PERSONAL HISTORY OF NICOTINE DEPENDENCE: ICD-10-CM

## 2024-09-04 PROCEDURE — G0296 VISIT TO DETERM LDCT ELIG: CPT

## 2024-09-04 NOTE — PLAN
[Smoking Cessation] : smoking cessation [FreeTextEntry1] : Plan:  -Low dose CT chest for lung cancer screening. Maddy Snow ordered the low dose CT.         -Follow up with  her referring provider after her LDCT results have been reviewed by the multidisciplinary clinical team, if needed.    -Encourage continued smoking abstinence.    Should I screen? tool utilized. 6 Year risk of lung cancer is  1.8 %.    Patient wishes to proceed with screening.   Engaged in discussion regarding risks of screening during Covid-19 pandemic and precautions that are being used  to reduce exposure.   Engaged in shared decision making with Ms. HICKS . Answered all questions. She verbalized understanding and agreement. She knows to call back with and questions or concerns.

## 2024-09-04 NOTE — HISTORY OF PRESENT ILLNESS
[Former] : Former [TextBox_13] : Referred by Dr. Maddy Snow    FORTINO HICKS had telephonic visit for a review of eligibility and discussion of the Low dose CT lung cancer screening program. A telephonic visit occurred due to the patient not having access to a smart phone or a computer for an audio/visual visit. The following was reviewed and confirmed the patient meets screening eligibility criteria. -Age 59 year Smoking Status: -Former smoker Started smoking at  15-16  years old.  -  PPD for 42 years.  -Number of pack years smokin -Number of years since quitting smoking: 3 -Quit year:    Ms. HICKS denies any signs or symptoms of lung cancer including new cough, changing cough, hemoptysis, and unintentional weight loss.   Ms. HICKS HX DICS in situ left breast, bilateral mastectomy; no need for radiation or chemotherapy. denies HX of lung disease, heart disease, connective tissue disease, and any personal history of cancer. Reports no lung cancer in a 1st degree relative. Reports no lung cancer in a 2nd degree relative. Had exposure to 2nd hand smoke. Denies any history of occupational exposures.    [YearQuit] : 2021 [PacksperYear] : 29

## 2024-09-09 ENCOUNTER — NON-APPOINTMENT (OUTPATIENT)
Age: 59
End: 2024-09-09

## 2024-09-09 NOTE — ASSESSMENT
[FreeTextEntry1] : The patient is a 59-year-old G2, P2 postmenopausal white female of Sandi and Irish descent.  She underwent menarche at age 11 and had her first child at age 22.  She underwent menopause at age 46 and never took any hormone replacement therapy.  She has no family history of breast or ovarian cancer.  Her sister had renal cell carcinoma and underwent a nephrectomy.  She quit smoking at age 56 and smoked about a half a pack a day starting in her 20s.  Her  is also a smoker.  The patient underwent a screening mammography at [facility] in [place], [place] back on January 2, 2024 showing some increasing calcifications in the left breast 6:00 region.  Diagnostic mammography confirmed these increasing calcifications on January 11, 2024.  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  She underwent a bilateral breast MRI on March 27, 2024 which showed an area of non-mass like enhancement measuring 3.2 x 2.1 x 2.5 cm corresponding to the area of calcifications on mammography.  She was then seen by Dr. Demetrice White at [facility] and underwent a left breast partial mastectomy and sentinel lymph node biopsy through a 6:00 periareolar incision on April 9, 2024.  The final pathology showed 1 negative sentinel lymph node and high-grade DCIS in 10 of 20 slides measuring 1.2 cm in greatest dimension.  She had a positive superior and medial margin and closest inferior and anterior margins less than a millimeter.  The DCIS was ER positive at 90% and TN moderately positive at 60%. She was seen by me as a second opinion on April 30, 2024 and after I reviewed all her images, I felt that a left mastectomy was indicated.  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  Her images were reviewed by my radiologist and it was felt that the cancer was far in enough away from the nipple to offer her nipple sparing technique.  The patient chose to have a contralateral prophylactic mastectomy.  She underwent the bilateral nipple sparing mastectomies through an inframammary technique with prepectoral direct to implant reconstructions by Dr. Rodgers on June 12, 2024. The final pathology showed some residual high-grade DCIS measuring up to 1.5 cm but margins were all clear as well as the left breast retroareolar biopsy.  The prophylactic right breast mastectomy just showed benign fibrocystic change in the right retroareolar biopsy was benign.  This was a left breast stage 0 breast cancer.  She was seen by Dr. Snow postoperatively and no endocrine therapy was recommended since she had bilateral mastectomies for DCIS.  On exam today, she has a good cosmetic result and her wounds have healed well and the mild epidermolysis of the right nipple areolar complex resolved.  She should follow-up again in 6 months and I would like her to get an MRI 1 year postop around June 2025.  The patient should continue to follow-up with Dr. Rodgers from a cosmetic standpoint.

## 2024-09-09 NOTE — HISTORY OF PRESENT ILLNESS
[FreeTextEntry1] : The patient is a 59-year-old G2, P2 postmenopausal white female of Sand[place] and [place] descent.  She underwent menarche at age 11 and had her first child at age 22.  She underwent menopause at age 46 and never took any hormone replacement therapy.  She has no family history of breast or ovarian cancer.  Her sister had renal cell carcinoma and underwent a nephrectomy.  She quit smoking at age 56 and smoked about a half a pack a day starting in her 20s.  Her  is also a smoker.  The patient underwent a screening mammography at [facility] in [place] back on January 2, 2024 showing some increasing calcifications in the left breast 6:00 region.  Diagnostic mammography confirmed these increasing calcifications on January 11, 2024.  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  She underwent a bilateral breast MRI on March 27, 2024 which showed an area of non-mass like enhancement measuring 3.2 x 2.1 x 2.5 cm corresponding to the area of calcifications on mammography.  She was then seen by Dr. Demetrice Wihte at [facility] and underwent a left breast partial mastectomy and sentinel lymph node biopsy through a 6:00 periareolar incision on April 9, 2024.  The final pathology showed 1 negative sentinel lymph node and high-grade DCIS in 10 of 20 slides measuring 1.2 cm in greatest dimension.  She had a positive superior and medial margin and closest inferior and anterior margins less than a millimeter.  The DCIS was ER positive at 90% and IA moderately positive at 60%.  She was seen as a second opinion on April 30, 2024 and it was felt that a left mastectomy was indicated.  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  Her images were reviewed by my radiologist and it was felt that the cancer was far in enough away from the nipple to offer her nipple sparing technique.  The patient chose to have a contralateral prophylactic mastectomy.  She underwent the bilateral nipple sparing mastectomies through an inframammary technique with prepectoral direct to implant reconstructions by Dr. Rodgers on June 12, 2024.  The final pathology showed some residual high-grade DCIS measuring up to 1.5 cm but margins were all clear as well as the left breast retroareolar biopsy.  The prophylactic right breast mastectomy just showed benign fibrocystic change in the right retroareolar biopsy was benign.  This was a left breast stage 0 breast cancer.  She was seen by Dr. Snow and no endocrine therapy was recommended given the fact that she had bilateral mastectomies for DCIS.  The patient comes in now for routine follow-up.

## 2024-09-09 NOTE — PHYSICAL EXAM
[Normocephalic] : normocephalic [Atraumatic] : atraumatic [EOMI] : extra ocular movement intact [Supple] : supple [No Supraclavicular Adenopathy] : no supraclavicular adenopathy [No Cervical Adenopathy] : no cervical adenopathy [Examined in the supine and seated position] : examined in the supine and seated position [No dominant masses] : no dominant masses in right breast  [No dominant masses] : no dominant masses left breast [No Nipple Retraction] : no left nipple retraction [No Nipple Discharge] : no left nipple discharge [Breast Mass Right Breast ___cm] : no masses [Breast Mass Left Breast ___cm] : no masses [Breast Nipple Inversion] : nipples not inverted [Breast Nipple Retraction] : nipples not retracted [Breast Nipple Flattening] : nipples not flattened [Breast Nipple Fissures] : nipples not fissured [Breast Abnormal Lactation (Galactorrhea)] : no galactorrhea [Breast Abnormal Secretion Bloody Fluid] : no bloody discharge [Breast Abnormal Secretion Serous Fluid] : no serous discharge [Breast Abnormal Secretion Opalescent Fluid] : no milky discharge [No Axillary Lymphadenopathy] : no left axillary lymphadenopathy [No Edema] : no edema [No Rashes] : no rashes [No Ulceration] : no ulceration [de-identified] : On exam, the patient is doing well after her bilateral nipple sparing mastectomies with prepectoral direct to implant reconstructions.  Her wounds have healed well and her right breast nipple areolar complex mild epidermolysis has resolved.  She has no suspicious findings over either implant.  She has no axillary, supraclavicular, or cervical adenopathy. [de-identified] : Status post prophylactic contralateral nipple sparing mastectomy through an inframammary approach with prepectoral direct to implant reconstruction.  Wounds have healed well and her mild right nipple areolar complex epidermolysis has resolved.  She has no suspicious findings. [de-identified] : Status post completion nipple sparing mastectomy through an inframammary approach with prepectoral direct to implant reconstruction.  Her wounds have healed well and she has no evidence of recurrence.

## 2024-09-09 NOTE — REASON FOR VISIT
[Follow-Up: _____] : a [unfilled] follow-up visit [FreeTextEntry1] : The patient is a postmenopausal white female of [place] and [place] descent with no family history of breast or ovarian cancer.  She was found to have some increasing calcifications in her left breast 6:00 region on screening mammography in January 2024 and underwent a stereotactic core biopsy at [facility] in February 2024 showing high nuclear grade DCIS which was ER/MD positive.  MRI showed localized but significant non-mass like enhancement around the calcifications.  She was seen by Dr. Demetrice White at [facility] and underwent a left breast partial mastectomy and sentinel lymph node biopsy on April 9, 2024 and had 1 negative sentinel lymph node and the partial mastectomy showed fairly extensive DCIS in 10 out of 20 slides with a positive superior and medial margin and close inferior and anterior margins less than 1 mm.  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  Images were reviewed and it was felt that mastectomy was indicated.  The patient chose to undergo bilateral mastectomies and this was performed with a nipple sparing technique with prepectoral direct to implant reconstructions by Dr. Rodgers on June 12, 2024.  The final pathology just showed residual high-grade DCIS measuring up to 1.5 cm with completely clear margins and the retroareolar biopsy was negative.  The prophylactic right breast mastectomy was benign as well as the retroareolar biopsy.  She was seen by Dr. Snow and no adjuvant endocrine therapy was recommended given the fact that she had bilateral mastectomies for DCIS.  She comes in now for routine follow-up. [FreeTextEntry2] : June 12, 2024

## 2024-09-09 NOTE — PAST MEDICAL HISTORY
[Postmenopausal] : The patient is postmenopausal [Menarche Age ____] : age at menarche was [unfilled] [Menopause Age____] : age at menopause was [unfilled] [History of Hormone Replacement Treatment] : has no history of hormone replacement treatment [unknown] : the patient is unsure of the date of her LMP [Total Preg ___] : G[unfilled] [Live Births ___] : P[unfilled]  [Age At Live Birth ___] : Age at live birth: [unfilled]

## 2024-09-13 ENCOUNTER — RESULT REVIEW (OUTPATIENT)
Age: 59
End: 2024-09-13

## 2024-10-01 ENCOUNTER — APPOINTMENT (OUTPATIENT)
Dept: BREAST CENTER | Facility: CLINIC | Age: 59
End: 2024-10-01
Payer: COMMERCIAL

## 2024-10-01 VITALS
SYSTOLIC BLOOD PRESSURE: 129 MMHG | DIASTOLIC BLOOD PRESSURE: 80 MMHG | BODY MASS INDEX: 23.79 KG/M2 | WEIGHT: 126 LBS | HEART RATE: 70 BPM | HEIGHT: 61 IN | OXYGEN SATURATION: 97 %

## 2024-10-01 DIAGNOSIS — Z85.3 PERSONAL HISTORY OF MALIGNANT NEOPLASM OF BREAST: ICD-10-CM

## 2024-10-01 DIAGNOSIS — Z90.13 ACQUIRED ABSENCE OF BILATERAL BREASTS AND NIPPLES: ICD-10-CM

## 2024-10-01 DIAGNOSIS — Z12.39 ENCOUNTER FOR OTHER SCREENING FOR MALIGNANT NEOPLASM OF BREAST: ICD-10-CM

## 2024-10-01 PROCEDURE — 99213 OFFICE O/P EST LOW 20 MIN: CPT

## 2024-10-01 NOTE — PHYSICAL EXAM
[Normocephalic] : normocephalic [Atraumatic] : atraumatic [EOMI] : extra ocular movement intact [Supple] : supple [No Supraclavicular Adenopathy] : no supraclavicular adenopathy [No Cervical Adenopathy] : no cervical adenopathy [Examined in the supine and seated position] : examined in the supine and seated position [No dominant masses] : no dominant masses in right breast  [No dominant masses] : no dominant masses left breast [No Nipple Retraction] : no left nipple retraction [No Nipple Discharge] : no left nipple discharge [Breast Mass Right Breast ___cm] : no masses [Breast Mass Left Breast ___cm] : no masses [No Axillary Lymphadenopathy] : no left axillary lymphadenopathy [No Edema] : no edema [No Ulceration] : no ulceration [No Rashes] : no rashes [Breast Nipple Inversion] : nipples not inverted [Breast Nipple Retraction] : nipples not retracted [Breast Nipple Flattening] : nipples not flattened [Breast Nipple Fissures] : nipples not fissured [Breast Abnormal Lactation (Galactorrhea)] : no galactorrhea [Breast Abnormal Secretion Bloody Fluid] : no bloody discharge [Breast Abnormal Secretion Serous Fluid] : no serous discharge [Breast Abnormal Secretion Opalescent Fluid] : no milky discharge [de-identified] : On exam, the patient is doing well after her bilateral nipple sparing mastectomies with prepectoral direct to implant reconstructions.  Her wounds have healed well and her right breast nipple areolar complex mild epidermolysis has resolved.  She has no suspicious findings over either implant.  She has no axillary, supraclavicular, or cervical adenopathy. [de-identified] : Status post prophylactic contralateral nipple sparing mastectomy through an inframammary approach with prepectoral direct to implant reconstruction.  Wounds have healed well and her mild right nipple areolar complex epidermolysis has resolved.  She has no suspicious findings. [de-identified] : Status post completion nipple sparing mastectomy through an inframammary approach with prepectoral direct to implant reconstruction.  Her wounds have healed well and she has no evidence of recurrence.

## 2024-10-01 NOTE — HISTORY OF PRESENT ILLNESS
[FreeTextEntry1] : The patient is a 59-year-old G2, P2 postmenopausal white female of Sandi and Yoruba descent.  She underwent menarche at age 11 and had her first child at age 22.  She underwent menopause at age 46 and never took any hormone replacement therapy.  She has no family history of breast or ovarian cancer.  Her sister had renal cell carcinoma and underwent a nephrectomy.  She quit smoking at age 56 and smoked about a half a pack a day starting in her 20s.  Her  is also a smoker.  The patient underwent a screening mammography at [facility] in [place] back on January 2, 2024 showing some increasing calcifications in the left breast 6:00 region.  Diagnostic mammography confirmed these increasing calcifications on January 11, 2024.  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  She underwent a bilateral breast MRI on March 27, 2024 which showed an area of non-mass like enhancement measuring 3.2 x 2.1 x 2.5 cm corresponding to the area of calcifications on mammography.  She was then seen by Dr. Demetrice White at [facility] and underwent a left breast partial mastectomy and sentinel lymph node biopsy through a 6:00 periareolar incision on April 9, 2024.  The final pathology showed 1 negative sentinel lymph node and high-grade DCIS in 10 of 20 slides measuring 1.2 cm in greatest dimension.  She had a positive superior and medial margin and closest inferior and anterior margins less than a millimeter.  The DCIS was ER positive at 90% and TN moderately positive at 60%.  She was seen as a second opinion on April 30, 2024 and it was felt that a left mastectomy was indicated.  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  Her images were reviewed by my radiologist and it was felt that the cancer was far in enough away from the nipple to offer her nipple sparing technique.  The patient chose to have a contralateral prophylactic mastectomy.  She underwent the bilateral nipple sparing mastectomies through an inframammary technique with prepectoral direct to implant reconstructions by Dr. Rodgers on June 12, 2024.  The final pathology showed some residual high-grade DCIS measuring up to 1.5 cm but margins were all clear as well as the left breast retroareolar biopsy.  The prophylactic right breast mastectomy just showed benign fibrocystic change in the right retroareolar biopsy was benign.  This was a left breast stage 0 breast cancer.  She was seen by Dr. Snow and no endocrine therapy was recommended given the fact that she had bilateral mastectomies for DCIS.  The patient comes in now for routine follow-up.

## 2024-10-01 NOTE — ASSESSMENT
[FreeTextEntry1] : The patient is a 59-year-old G2, P2 postmenopausal white female of Sandi and [place] descent.  She underwent menarche at age 11 and had her first child at age 22.  She underwent menopause at age 46 and never took any hormone replacement therapy.  She has no family history of breast or ovarian cancer.  Her sister had renal cell carcinoma and underwent a nephrectomy.  She quit smoking at age 56 and smoked about a half a pack a day starting in her 20s.  Her  is also a smoker.  The patient underwent a screening mammography at [facility] in [place], [place] back on January 2, 2024 showing some increasing calcifications in the left breast 6:00 region.  Diagnostic mammography confirmed these increasing calcifications on January 11, 2024.  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  She underwent a bilateral breast MRI on March 27, 2024 which showed an area of non-mass like enhancement measuring 3.2 x 2.1 x 2.5 cm corresponding to the area of calcifications on mammography.  She was then seen by Dr. Demetrice White at [facility] and underwent a left breast partial mastectomy and sentinel lymph node biopsy through a 6:00 periareolar incision on April 9, 2024.  The final pathology showed 1 negative sentinel lymph node and high-grade DCIS in 10 of 20 slides measuring 1.2 cm in greatest dimension.  She had a positive superior and medial margin and closest inferior and anterior margins less than a millimeter.  The DCIS was ER positive at 90% and AR moderately positive at 60%. She was seen by me as a second opinion on April 30, 2024 and after I reviewed all her images, I felt that a left mastectomy was indicated.  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  Her images were reviewed by my radiologist and it was felt that the cancer was far in enough away from the nipple to offer her nipple sparing technique.  The patient chose to have a contralateral prophylactic mastectomy.  She underwent the bilateral nipple sparing mastectomies through an inframammary technique with prepectoral direct to implant reconstructions by Dr. Rodgers on June 12, 2024. The final pathology showed some residual high-grade DCIS measuring up to 1.5 cm but margins were all clear as well as the left breast retroareolar biopsy.  The prophylactic right breast mastectomy just showed benign fibrocystic change in the right retroareolar biopsy was benign.  This was a left breast stage 0 breast cancer.  She was seen by Dr. Snow postoperatively and no endocrine therapy was recommended since she had bilateral mastectomies for DCIS.  On exam today, she has a good cosmetic result and her wounds have healed well and the mild epidermolysis of the right nipple areolar complex resolved.  She should follow-up again in 6 months and I would like her to get an MRI 1 year postop around June 2025.  The patient should continue to follow-up with Dr. Rodgers from a cosmetic standpoint.

## 2024-10-01 NOTE — REASON FOR VISIT
[Follow-Up: _____] : a [unfilled] follow-up visit [FreeTextEntry1] : The patient is a postmenopausal white female of [place] and [place] descent with no family history of breast or ovarian cancer.  She was found to have some increasing calcifications in her left breast 6:00 region on screening mammography in January 2024 and underwent a stereotactic core biopsy at [facility] in February 2024 showing high nuclear grade DCIS which was ER/AK positive.  MRI showed localized but significant non-mass like enhancement around the calcifications.  She was seen by Dr. Demetrice White at [facility] and underwent a left breast partial mastectomy and sentinel lymph node biopsy on April 9, 2024 and had 1 negative sentinel lymph node and the partial mastectomy showed fairly extensive DCIS in 10 out of 20 slides with a positive superior and medial margin and close inferior and anterior margins less than 1 mm.  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  Images were reviewed and it was felt that mastectomy was indicated.  The patient chose to undergo bilateral mastectomies and this was performed with a nipple sparing technique with prepectoral direct to implant reconstructions by Dr. Rodgers on June 12, 2024.  The final pathology just showed residual high-grade DCIS measuring up to 1.5 cm with completely clear margins and the retroareolar biopsy was negative.  The prophylactic right breast mastectomy was benign as well as the retroareolar biopsy.  She was seen by Dr. Snow and no adjuvant endocrine therapy was recommended given the fact that she had bilateral mastectomies for DCIS.  She comes in now for routine follow-up. [FreeTextEntry2] : June 12, 2024

## 2024-10-01 NOTE — PHYSICAL EXAM
[Normocephalic] : normocephalic [Atraumatic] : atraumatic [EOMI] : extra ocular movement intact [Supple] : supple [No Supraclavicular Adenopathy] : no supraclavicular adenopathy [No Cervical Adenopathy] : no cervical adenopathy [No dominant masses] : no dominant masses in right breast  [Examined in the supine and seated position] : examined in the supine and seated position [No dominant masses] : no dominant masses left breast [No Nipple Retraction] : no left nipple retraction [No Nipple Discharge] : no left nipple discharge [Breast Mass Right Breast ___cm] : no masses [Breast Mass Left Breast ___cm] : no masses [No Axillary Lymphadenopathy] : no left axillary lymphadenopathy [No Edema] : no edema [No Ulceration] : no ulceration [No Rashes] : no rashes [Breast Nipple Inversion] : nipples not inverted [Breast Nipple Retraction] : nipples not retracted [Breast Nipple Flattening] : nipples not flattened [Breast Nipple Fissures] : nipples not fissured [Breast Abnormal Lactation (Galactorrhea)] : no galactorrhea [Breast Abnormal Secretion Bloody Fluid] : no bloody discharge [Breast Abnormal Secretion Serous Fluid] : no serous discharge [Breast Abnormal Secretion Opalescent Fluid] : no milky discharge [de-identified] : On exam, the patient is doing well after her bilateral nipple sparing mastectomies with prepectoral direct to implant reconstructions.  Her wounds have healed well and her right breast nipple areolar complex mild epidermolysis has resolved.  She has no suspicious findings over either implant.  She has no axillary, supraclavicular, or cervical adenopathy. [de-identified] : Status post prophylactic contralateral nipple sparing mastectomy through an inframammary approach with prepectoral direct to implant reconstruction.  Wounds have healed well and her mild right nipple areolar complex epidermolysis has resolved.  She has no suspicious findings. [de-identified] : Status post completion nipple sparing mastectomy through an inframammary approach with prepectoral direct to implant reconstruction.  Her wounds have healed well and she has no evidence of recurrence.

## 2024-10-01 NOTE — ASSESSMENT
[FreeTextEntry1] : The patient is a 59-year-old G2, P2 postmenopausal white female of Sandi and English descent.  She underwent menarche at age 11 and had her first child at age 22.  She underwent menopause at age 46 and never took any hormone replacement therapy.  She has no family history of breast or ovarian cancer.  Her sister had renal cell carcinoma and underwent a nephrectomy.  She quit smoking at age 56 and smoked about a half a pack a day starting in her 20s.  Her  is also a smoker.  The patient underwent a screening mammography at [facility] in [place], [place] back on January 2, 2024 showing some increasing calcifications in the left breast 6:00 region.  Diagnostic mammography confirmed these increasing calcifications on January 11, 2024.  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  She underwent a bilateral breast MRI on March 27, 2024 which showed an area of non-mass like enhancement measuring 3.2 x 2.1 x 2.5 cm corresponding to the area of calcifications on mammography.  She was then seen by Dr. Demetrice White at [facility] and underwent a left breast partial mastectomy and sentinel lymph node biopsy through a 6:00 periareolar incision on April 9, 2024.  The final pathology showed 1 negative sentinel lymph node and high-grade DCIS in 10 of 20 slides measuring 1.2 cm in greatest dimension.  She had a positive superior and medial margin and closest inferior and anterior margins less than a millimeter.  The DCIS was ER positive at 90% and W[place] moderately positive at 60%. She was seen by me as a second opinion on April 30, 2024 and after I reviewed all her images, I felt that a left mastectomy was indicated.  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  Her images were reviewed by my radiologist and it was felt that the cancer was far in enough away from the nipple to offer her nipple sparing technique.  The patient chose to have a contralateral prophylactic mastectomy.  She underwent the bilateral nipple sparing mastectomies through an inframammary technique with prepectoral direct to implant reconstructions by Dr. Rodgers on June 12, 2024. The final pathology showed some residual high-grade DCIS measuring up to 1.5 cm but margins were all clear as well as the left breast retroareolar biopsy.  The prophylactic right breast mastectomy just showed benign fibrocystic change in the right retroareolar biopsy was benign.  This was a left breast stage 0 breast cancer.  She was seen by Dr. Snow postoperatively and no endocrine therapy was recommended since she had bilateral mastectomies for DCIS.  On exam today, she has a good cosmetic result and her wounds have healed well and the mild epidermolysis of the right nipple areolar complex resolved.  She should follow-up again in 6 months and I would like her to get an MRI 1 year postop around June 2025.  The patient should continue to follow-up with Dr. Rodgers from a cosmetic standpoint.

## 2024-10-01 NOTE — ADDENDUM
[FreeTextEntry1] : I spent greater than 75% of the consultation in face-to-face counseling and coordination of care in this patient with a history of a left breast cancer who underwent bilateral mastectomies and comes in now for routine breast cancer screening/surveillance.

## 2024-12-03 ENCOUNTER — APPOINTMENT (OUTPATIENT)
Dept: PLASTIC SURGERY | Facility: CLINIC | Age: 59
End: 2024-12-03
Payer: COMMERCIAL

## 2024-12-03 PROCEDURE — 99213 OFFICE O/P EST LOW 20 MIN: CPT

## 2025-02-24 ENCOUNTER — RESULT REVIEW (OUTPATIENT)
Age: 60
End: 2025-02-24

## 2025-02-24 ENCOUNTER — APPOINTMENT (OUTPATIENT)
Dept: HEMATOLOGY ONCOLOGY | Facility: CLINIC | Age: 60
End: 2025-02-24
Payer: COMMERCIAL

## 2025-02-24 ENCOUNTER — NON-APPOINTMENT (OUTPATIENT)
Age: 60
End: 2025-02-24

## 2025-02-24 VITALS
DIASTOLIC BLOOD PRESSURE: 77 MMHG | TEMPERATURE: 98.3 F | HEART RATE: 62 BPM | SYSTOLIC BLOOD PRESSURE: 134 MMHG | BODY MASS INDEX: 24.21 KG/M2 | RESPIRATION RATE: 16 BRPM | OXYGEN SATURATION: 95 % | WEIGHT: 128.25 LBS | HEIGHT: 61 IN

## 2025-02-24 DIAGNOSIS — D05.12 INTRADUCTAL CARCINOMA IN SITU OF LEFT BREAST: ICD-10-CM

## 2025-02-24 PROCEDURE — 99214 OFFICE O/P EST MOD 30 MIN: CPT | Mod: 25

## 2025-02-24 RX ORDER — MULTIVIT-MIN/IRON/FOLIC ACID/K 18-600-40
CAPSULE ORAL
Refills: 0 | Status: ACTIVE | COMMUNITY

## 2025-02-24 RX ORDER — KRILL/OM-3/DHA/EPA/PHOSPHO/AST 1000-230MG
CAPSULE ORAL
Refills: 0 | Status: ACTIVE | COMMUNITY

## 2025-04-01 ENCOUNTER — APPOINTMENT (OUTPATIENT)
Dept: BREAST CENTER | Facility: CLINIC | Age: 60
End: 2025-04-01
Payer: COMMERCIAL

## 2025-04-01 VITALS — WEIGHT: 124 LBS | HEIGHT: 61 IN | BODY MASS INDEX: 23.41 KG/M2

## 2025-04-01 DIAGNOSIS — Z12.39 ENCOUNTER FOR OTHER SCREENING FOR MALIGNANT NEOPLASM OF BREAST: ICD-10-CM

## 2025-04-01 DIAGNOSIS — Z90.13 ACQUIRED ABSENCE OF BILATERAL BREASTS AND NIPPLES: ICD-10-CM

## 2025-04-01 DIAGNOSIS — Z85.3 PERSONAL HISTORY OF MALIGNANT NEOPLASM OF BREAST: ICD-10-CM

## 2025-04-01 PROCEDURE — 99213 OFFICE O/P EST LOW 20 MIN: CPT

## 2025-04-28 ENCOUNTER — RESULT REVIEW (OUTPATIENT)
Age: 60
End: 2025-04-28

## 2025-09-13 ENCOUNTER — NON-APPOINTMENT (OUTPATIENT)
Age: 60
End: 2025-09-13